# Patient Record
Sex: MALE | Race: BLACK OR AFRICAN AMERICAN | Employment: OTHER | URBAN - METROPOLITAN AREA
[De-identification: names, ages, dates, MRNs, and addresses within clinical notes are randomized per-mention and may not be internally consistent; named-entity substitution may affect disease eponyms.]

---

## 2018-01-01 ENCOUNTER — HOSPITAL ENCOUNTER (EMERGENCY)
Age: 38
End: 2018-11-26
Attending: EMERGENCY MEDICINE
Payer: COMMERCIAL

## 2018-01-01 VITALS
HEIGHT: 70 IN | BODY MASS INDEX: 44.52 KG/M2 | RESPIRATION RATE: 28 BRPM | OXYGEN SATURATION: 53 % | DIASTOLIC BLOOD PRESSURE: 143 MMHG | HEART RATE: 157 BPM | SYSTOLIC BLOOD PRESSURE: 211 MMHG | WEIGHT: 311 LBS

## 2018-01-01 DIAGNOSIS — I46.9 CARDIOPULMONARY ARREST (HCC): Primary | ICD-10-CM

## 2018-01-01 LAB
CHP ED QC CHECK: YES
GLUCOSE BLD-MCNC: 306 MG/DL
GLUCOSE BLD-MCNC: 306 MG/DL (ref 60–115)
PERFORMED ON: ABNORMAL

## 2018-01-01 PROCEDURE — 99284 EMERGENCY DEPT VISIT MOD MDM: CPT

## 2018-11-26 NOTE — ED NOTES
This is a  case, per Dr Bear Rodriguez, body to be held in [address] [place] [address], [address] [facility]  11/26/18 6798

## 2018-11-26 NOTE — ED PROVIDER NOTES
MEDICATIONS       Previous Medications    No medications on file       ALLERGIES     Patient has no allergy information on record. FAMILY HISTORY     No family history on file. SOCIAL HISTORY       Social History     Social History    Marital status:      Spouse name: N/A    Number of children: N/A    Years of education: N/A     Social History Main Topics    Smoking status: Not on file    Smokeless tobacco: Not on file    Alcohol use Not on file    Drug use: Unknown    Sexual activity: Not on file     Other Topics Concern    Not on file     Social History Narrative    No narrative on file       SCREENINGS      @FLOW([id number])@      PHYSICAL EXAM    (up to 7 for level 4, 8 or more for level 5)     ED Triage Vitals [11/25/18 2242]   BP Temp Temp src Pulse Resp SpO2 Height Weight   (!) 211/143 -- -- 157 28 (!) 53 % -- --       Physical Exam   Constitutional: He appears well-developed. He is intubated. HENT:   Head: Normocephalic and atraumatic. Eyes:   Pupils are dilated bilateral unresponsive   Neck: Neck supple. No JVD present. Cardiovascular:   No spontaneous heart tones   Pulmonary/Chest: He is intubated. Abdominal: Soft. He exhibits no distension. Musculoskeletal: He exhibits no edema or deformity. Neurological:   Patient unresponsive with a Glascow 3. Skin: Skin is warm.        DIAGNOSTIC RESULTS     EKG: All EKG's are interpreted by the Emergency Department Physician who either signs or Co-signsthis chart in the absence of a cardiologist.    Monitor shows asystole in 2 separate leads    RADIOLOGY:   Non-plain filmimages such as CT, Ultrasound and MRI are read by the radiologist. Plain radiographic images are visualized and preliminarily interpreted by the emergency physician with the below findings:        Interpretation per the Radiologist below, if available at the time ofthis note:    No orders to display         ED BEDSIDE ULTRASOUND:   Performed by ED Physician - none    LABS:  Labs Reviewed   POCT GLUCOSE - Abnormal; Notable for the following:        Result Value    POC Glucose 306 (*)     All other components within normal limits       All other labs were within normal range or not returned as of this dictation. EMERGENCY DEPARTMENT COURSE and DIFFERENTIAL DIAGNOSIS/MDM:   Vitals:    Vitals:    18 2242   BP: (!) 211/143   Pulse: 157   Resp: 28   SpO2: (!) 53%            MDM patient sustained cardiopulmonary arrest.  He had prolonged CPR with multiple medications and no clinical response. He was properly intubated prehospital and IV access was obtained. With failure of any clinical response for more than 40 minutes the  patient was pronounced dead at 10:44 PM        CONSULTS:  None    PROCEDURES:  Unless otherwise noted below, none     Procedures    FINAL IMPRESSION      1. Cardiopulmonary arrest [facility])          DISPOSITION/PLAN   DISPOSITION  2018 10:59:16 PM      PATIENT REFERRED TO:  No follow-up provider specified.     DISCHARGE MEDICATIONS:  New Prescriptions    No medications on file          (Please note that portions of this note were completed with a voice recognition program.  Efforts were made to edit the dictations but occasionally words are mis-transcribed.)    Sindy Bay MD (electronically signed)  Attending Emergency Physician          Sindy Bay MD  [phone]       Sindy Bay MD  [phone]